# Patient Record
Sex: MALE | Race: BLACK OR AFRICAN AMERICAN | Employment: FULL TIME | URBAN - METROPOLITAN AREA
[De-identification: names, ages, dates, MRNs, and addresses within clinical notes are randomized per-mention and may not be internally consistent; named-entity substitution may affect disease eponyms.]

---

## 2017-05-19 ENCOUNTER — TELEPHONE (OUTPATIENT)
Dept: FAMILY MEDICINE CLINIC | Facility: CLINIC | Age: 22
End: 2017-05-19

## 2017-05-19 DIAGNOSIS — Z11.1 SCREENING-PULMONARY TB: Primary | ICD-10-CM

## 2017-05-19 NOTE — TELEPHONE ENCOUNTER
Per Aarti Beth of [facility] she has an  appt on 06/12/2017 @ 11:20AM would like to know is FR can see pt also, no available appt except for MD approval only.

## 2017-06-14 ENCOUNTER — OFFICE VISIT (OUTPATIENT)
Dept: FAMILY MEDICINE CLINIC | Facility: CLINIC | Age: 22
End: 2017-06-14

## 2017-06-14 VITALS
RESPIRATION RATE: 16 BRPM | TEMPERATURE: 98 F | HEIGHT: 73 IN | HEART RATE: 86 BPM | SYSTOLIC BLOOD PRESSURE: 100 MMHG | WEIGHT: 197 LBS | BODY MASS INDEX: 26.11 KG/M2 | DIASTOLIC BLOOD PRESSURE: 70 MMHG

## 2017-06-14 DIAGNOSIS — R21 RASH AND NONSPECIFIC SKIN ERUPTION: ICD-10-CM

## 2017-06-14 DIAGNOSIS — Z00.00 PREVENTATIVE HEALTH CARE: Primary | ICD-10-CM

## 2017-06-14 DIAGNOSIS — H10.11 ALLERGIC CONJUNCTIVITIS, RIGHT: ICD-10-CM

## 2017-06-14 PROCEDURE — 99212 OFFICE O/P EST SF 10 MIN: CPT | Performed by: FAMILY MEDICINE

## 2017-06-14 PROCEDURE — 99395 PREV VISIT EST AGE 18-39: CPT | Performed by: FAMILY MEDICINE

## 2017-06-14 PROCEDURE — 99213 OFFICE O/P EST LOW 20 MIN: CPT | Performed by: FAMILY MEDICINE

## 2017-06-14 RX ORDER — FEXOFENADINE HCL 180 MG/1
180 TABLET ORAL DAILY
Qty: 30 TABLET | Refills: 1 | Status: SHIPPED | OUTPATIENT
Start: 2017-06-14 | End: 2021-12-28 | Stop reason: ALTCHOICE

## 2017-06-14 RX ORDER — KETOTIFEN FUMARATE 0.35 MG/ML
1 SOLUTION/ DROPS OPHTHALMIC 2 TIMES DAILY
Qty: 5 ML | Refills: 0 | Status: SHIPPED | OUTPATIENT
Start: 2017-06-14 | End: 2020-07-21 | Stop reason: ALTCHOICE

## 2017-06-14 NOTE — PROGRESS NOTES
HPI:    Patient ID: Tashia Ha is a 22year old male. HPI Comments: 22year old AA male for routine preventive care. No interest in STD screening. Thriving @ [facility] (entering [facility] year). Bowel, bladder, and sexual function intact. Mouth/Throat: Oropharynx is clear and moist.   Neck: No thyromegaly present. Cardiovascular: Normal rate, regular rhythm and normal heart sounds. No murmur heard. Pulmonary/Chest: Effort normal and breath sounds normal. No respiratory distress.    Abd

## 2018-03-02 ENCOUNTER — TELEPHONE (OUTPATIENT)
Dept: FAMILY MEDICINE CLINIC | Facility: CLINIC | Age: 23
End: 2018-03-02

## 2018-03-03 NOTE — TELEPHONE ENCOUNTER
Pt was not available, but his grandmother stts that pt is leaving for college on Monday, and will get the shots that he needs for traveling there. Advised that pt had Hep A #1 iin 2015, and might need one more to complete the series.

## 2018-12-11 ENCOUNTER — OFFICE VISIT (OUTPATIENT)
Dept: FAMILY MEDICINE CLINIC | Facility: CLINIC | Age: 23
End: 2018-12-11
Payer: COMMERCIAL

## 2018-12-11 VITALS
SYSTOLIC BLOOD PRESSURE: 100 MMHG | DIASTOLIC BLOOD PRESSURE: 84 MMHG | HEIGHT: 73 IN | TEMPERATURE: 96 F | BODY MASS INDEX: 28.63 KG/M2 | WEIGHT: 216 LBS | HEART RATE: 89 BPM | RESPIRATION RATE: 17 BRPM

## 2018-12-11 DIAGNOSIS — Z13.29 THYROID DISORDER SCREEN: ICD-10-CM

## 2018-12-11 DIAGNOSIS — Z71.84 COUNSELING FOR TRAVEL: Primary | ICD-10-CM

## 2018-12-11 DIAGNOSIS — Z13.220 LIPID SCREENING: ICD-10-CM

## 2018-12-11 DIAGNOSIS — Z00.00 ROUTINE PHYSICAL EXAMINATION: ICD-10-CM

## 2018-12-11 DIAGNOSIS — Z23 NEED FOR HEPATITIS A IMMUNIZATION: ICD-10-CM

## 2018-12-11 DIAGNOSIS — Z11.3 SCREEN FOR STD (SEXUALLY TRANSMITTED DISEASE): ICD-10-CM

## 2018-12-11 PROCEDURE — 99214 OFFICE O/P EST MOD 30 MIN: CPT | Performed by: FAMILY MEDICINE

## 2018-12-11 PROCEDURE — 90632 HEPA VACCINE ADULT IM: CPT | Performed by: FAMILY MEDICINE

## 2018-12-11 PROCEDURE — 99212 OFFICE O/P EST SF 10 MIN: CPT | Performed by: FAMILY MEDICINE

## 2018-12-11 PROCEDURE — 99395 PREV VISIT EST AGE 18-39: CPT | Performed by: FAMILY MEDICINE

## 2018-12-11 PROCEDURE — 90471 IMMUNIZATION ADMIN: CPT | Performed by: FAMILY MEDICINE

## 2018-12-11 RX ORDER — MEFLOQUINE HYDROCHLORIDE 250 MG/1
250 TABLET ORAL
Qty: 6 TABLET | Refills: 0 | Status: SHIPPED | OUTPATIENT
Start: 2018-12-11 | End: 2020-07-21 | Stop reason: ALTCHOICE

## 2018-12-11 NOTE — PATIENT INSTRUCTIONS
All adult screening ordered and done appropriate for patient's age and gender and risk factors and complaints. Travel vaccines ordered and prophylaxis prescriptions given.   Patient counseled on the importance of abstinence and if sex occurs of any type co

## 2018-12-11 NOTE — PROGRESS NOTES
HPI:    Patient ID: Miki Dooley is a 25year old male.     25year old AA male here for complete preventive care physical and for status update on any confirmed chronic medical illnesses and follow up on any previous labs or procedures that were sugge Neurological: He is alert and oriented to person, place, and time. ASSESSMENT/PLAN:   1. Counseling for travel  Prescribed  - Mefloquine HCl 250 MG Oral Tab; Take 1 tablet (250 mg total) by mouth every 7 days.  Take the first tablet exactly o screening ordered and done appropriate for patient's age and gender and risk factors and complaints. Travel vaccines ordered and prophylaxis prescriptions given.   Patient counseled on the importance of abstinence and if sex occurs of any type condoms milviau

## 2018-12-18 ENCOUNTER — TELEPHONE (OUTPATIENT)
Dept: FAMILY MEDICINE CLINIC | Facility: CLINIC | Age: 23
End: 2018-12-18

## 2019-07-16 ENCOUNTER — TELEPHONE (OUTPATIENT)
Dept: FAMILY MEDICINE CLINIC | Facility: CLINIC | Age: 24
End: 2019-07-16

## 2019-07-16 NOTE — TELEPHONE ENCOUNTER
Pt would like to know if he needs another meningitis vaccination also would like to  copy of his vaccines. Pt states message can be left on his cell phone. Per pt he will be leaving next week to [place].

## 2019-07-23 ENCOUNTER — TELEPHONE (OUTPATIENT)
Dept: FAMILY MEDICINE CLINIC | Facility: CLINIC | Age: 24
End: 2019-07-23

## 2019-07-23 NOTE — TELEPHONE ENCOUNTER
Pt called stated he faxed today a [place] [employer] form to be completed for MMR form to be completed    Pt Called to see if form was received  Called office to verify if the form was received  Was told form was not here.     Pt said he melyssa

## 2020-07-21 ENCOUNTER — OFFICE VISIT (OUTPATIENT)
Dept: FAMILY MEDICINE CLINIC | Facility: CLINIC | Age: 25
End: 2020-07-21
Payer: COMMERCIAL

## 2020-07-21 VITALS
SYSTOLIC BLOOD PRESSURE: 128 MMHG | HEIGHT: 73 IN | RESPIRATION RATE: 18 BRPM | DIASTOLIC BLOOD PRESSURE: 77 MMHG | WEIGHT: 223 LBS | HEART RATE: 72 BPM | TEMPERATURE: 97 F | BODY MASS INDEX: 29.55 KG/M2

## 2020-07-21 DIAGNOSIS — Z11.3 ROUTINE SCREENING FOR STI (SEXUALLY TRANSMITTED INFECTION): ICD-10-CM

## 2020-07-21 DIAGNOSIS — Z00.00 ENCOUNTER FOR PREVENTIVE CARE: Primary | ICD-10-CM

## 2020-07-21 PROCEDURE — 3078F DIAST BP <80 MM HG: CPT | Performed by: FAMILY MEDICINE

## 2020-07-21 PROCEDURE — 3008F BODY MASS INDEX DOCD: CPT | Performed by: FAMILY MEDICINE

## 2020-07-21 PROCEDURE — 36415 COLL VENOUS BLD VENIPUNCTURE: CPT | Performed by: FAMILY MEDICINE

## 2020-07-21 PROCEDURE — 3074F SYST BP LT 130 MM HG: CPT | Performed by: FAMILY MEDICINE

## 2020-07-21 PROCEDURE — 99395 PREV VISIT EST AGE 18-39: CPT | Performed by: FAMILY MEDICINE

## 2020-07-21 NOTE — PROGRESS NOTES
HPI:    Patient ID: Miki Dooley is a 22year old male. This patient is a 81-year-old -American male who presents today for general preventive care physical as well as STI screening. He is asymptomatic and has no known contacts.   Good appet REFLEX TO FREE T4 [45711][Q]      LIPID PANEL [4060] [Q]      COMP METABOLIC PANEL [81691] [Q]      CBC [6399] [Q]      HEPATITIS PANEL, ACUTE [08685] [Q]      T Pallidum Screening Cascade [653][Q]      HIV-1/HIV-2 ANTIBODIES [95393] [Q]      Chlamydia/Gc

## 2020-07-22 LAB
ABSOLUTE BASOPHILS: 20 CELLS/UL (ref 0–200)
ABSOLUTE EOSINOPHILS: 102 CELLS/UL (ref 15–500)
ABSOLUTE LYMPHOCYTES: 1360 CELLS/UL (ref 850–3900)
ABSOLUTE MONOCYTES: 354 CELLS/UL (ref 200–950)
ABSOLUTE NEUTROPHILS: 1064 CELLS/UL (ref 1500–7800)
ALBUMIN/GLOBULIN RATIO: 1.5 (CALC) (ref 1–2.5)
ALBUMIN: 4.3 G/DL (ref 3.6–5.1)
ALKALINE PHOSPHATASE: 83 U/L (ref 36–130)
ALT: 25 U/L (ref 9–46)
AST: 19 U/L (ref 10–40)
BASOPHILS: 0.7 %
BILIRUBIN, TOTAL: 0.9 MG/DL (ref 0.2–1.2)
BUN: 10 MG/DL (ref 7–25)
CALCIUM: 9.8 MG/DL (ref 8.6–10.3)
CARBON DIOXIDE: 28 MMOL/L (ref 20–32)
CHLAMYDIA TRACHOMATIS$RNA, TMA: NOT DETECTED
CHLORIDE: 104 MMOL/L (ref 98–110)
CHOL/HDLC RATIO: 2.1 (CALC)
CHOLESTEROL, TOTAL: 124 MG/DL
CREATININE: 0.87 MG/DL (ref 0.6–1.35)
EGFR IF AFRICN AM: 141 ML/MIN/1.73M2
EGFR IF NONAFRICN AM: 122 ML/MIN/1.73M2
EOSINOPHILS: 3.5 %
GLOBULIN: 2.9 G/DL (CALC) (ref 1.9–3.7)
GLUCOSE: 87 MG/DL (ref 65–99)
HDL CHOLESTEROL: 58 MG/DL
HEMATOCRIT: 49 % (ref 38.5–50)
HEMOGLOBIN: 16.8 G/DL (ref 13.2–17.1)
LDL-CHOLESTEROL: 53 MG/DL (CALC)
LYMPHOCYTES: 46.9 %
MCH: 30.5 PG (ref 27–33)
MCHC: 34.3 G/DL (ref 32–36)
MCV: 89.1 FL (ref 80–100)
MONOCYTES: 12.2 %
MPV: 11.9 FL (ref 7.5–12.5)
NEISSERIA GONORRHOEAE$RNA, TMA: NOT DETECTED
NEUTROPHILS: 36.7 %
NON-HDL CHOLESTEROL: 66 MG/DL (CALC)
PLATELET COUNT: 168 THOUSAND/UL (ref 140–400)
POTASSIUM: 4.7 MMOL/L (ref 3.5–5.3)
PROTEIN, TOTAL: 7.2 G/DL (ref 6.1–8.1)
RDW: 13.2 % (ref 11–15)
RED BLOOD CELL COUNT: 5.5 MILLION/UL (ref 4.2–5.8)
SIGNAL TO CUT-OFF: 0.02
SODIUM: 140 MMOL/L (ref 135–146)
TRIGLYCERIDES: 53 MG/DL
TSH W/REFLEX TO FT4: 0.74 MIU/L (ref 0.4–4.5)
WHITE BLOOD CELL COUNT: 2.9 THOUSAND/UL (ref 3.8–10.8)

## 2020-07-23 ENCOUNTER — TELEPHONE (OUTPATIENT)
Dept: FAMILY MEDICINE CLINIC | Facility: CLINIC | Age: 25
End: 2020-07-23

## 2020-07-23 DIAGNOSIS — D72.819 LEUKOPENIA, UNSPECIFIED TYPE: Primary | ICD-10-CM

## 2020-07-23 NOTE — TELEPHONE ENCOUNTER
Patient states he was advised to let Dr know if he was going to be in town for a while so the hematologist can look into his labs.  Patient states he will be in town for the next 3 weeks

## 2020-07-23 NOTE — TELEPHONE ENCOUNTER
Patient states will be in town for the next week and 1/2, requesting callback asap to get an understanding from Dr about message left about hematologist.

## 2020-07-24 NOTE — TELEPHONE ENCOUNTER
I left an extensive message with this patient. This is not an acute situation. I believe that he is healthy. Some -Americans actually have a low white blood cell count and left that body is fighting some off.   We can follow him with a simple CBC

## 2020-12-24 ENCOUNTER — TELEPHONE (OUTPATIENT)
Dept: FAMILY MEDICINE CLINIC | Facility: CLINIC | Age: 25
End: 2020-12-24

## 2020-12-24 DIAGNOSIS — D72.819 LEUKOPENIA, UNSPECIFIED TYPE: Primary | ICD-10-CM

## 2020-12-24 NOTE — TELEPHONE ENCOUNTER
Patient states per last visit he was infromed his white blood cells were low. Wants to know if there is anything he should be following up on. should he be getting any blood work? Patient states he has been taking Vitamin D and he feels well.   Ok to get ba

## 2020-12-24 NOTE — TELEPHONE ENCOUNTER
This is not uncommon in -American race. We can do a repeat CBC just for assessing any progress or establishing stability. Nothing to worry. When he can he can come in for a repeat CBC. The order is on the chart.

## 2020-12-29 ENCOUNTER — PATIENT MESSAGE (OUTPATIENT)
Dept: FAMILY MEDICINE CLINIC | Facility: CLINIC | Age: 25
End: 2020-12-29

## 2020-12-29 NOTE — TELEPHONE ENCOUNTER
From: [facility]  To:  Ella Black DO  Sent: 12/29/2020 7:29 AM CST  Subject: Test Results Thomas Art Single, I have a question about CBC WITH DIFFERENTIAL WITH PLATELET resulted on 12/29/20 at 12:14 AM. So what does my results me

## 2020-12-30 DIAGNOSIS — D75.1 POLYCYTHEMIA: Primary | ICD-10-CM

## 2020-12-30 NOTE — TELEPHONE ENCOUNTER
Vitamin D 3 and C are not the cause of the elevated red blood cell count. Please help the patient with the \"David\" portion of his question.

## 2020-12-31 ENCOUNTER — VIRTUAL PHONE E/M (OUTPATIENT)
Dept: FAMILY MEDICINE CLINIC | Facility: CLINIC | Age: 25
End: 2020-12-31
Payer: COMMERCIAL

## 2020-12-31 DIAGNOSIS — D75.1 POLYCYTHEMIA: Primary | ICD-10-CM

## 2020-12-31 DIAGNOSIS — D70.9 NEUTROPENIA, UNSPECIFIED TYPE (HCC): ICD-10-CM

## 2020-12-31 PROCEDURE — 99442 PHONE E/M BY PHYS 11-20 MIN: CPT | Performed by: FAMILY MEDICINE

## 2020-12-31 NOTE — PATIENT INSTRUCTIONS
The patient has agreed to see the hematologist.  I reviewed with the patient regarding his increased hemoglobin on intermittent occasions and told him statistically this is probably just who he is.   I did explain that it is necessary to letter hematologist

## 2020-12-31 NOTE — PROGRESS NOTES
Virtual Telephone Check-In    Garrett Dancer verbally consents to a Virtual/Telephone Check-In visit on 12/31/20.     Patient understands and accepts financial responsibility for any deductible, co-insurance and/or co-pays associated with this servic real-time interactive audio and/or video communication.   This has been done in good tawana to provide continuity of care in the best interest of the provider-patient relationship, due to the ongoing public health crisis/national emergency and because of res

## 2021-01-05 ENCOUNTER — OFFICE VISIT (OUTPATIENT)
Dept: HEMATOLOGY/ONCOLOGY | Facility: HOSPITAL | Age: 26
End: 2021-01-05
Attending: INTERNAL MEDICINE
Payer: COMMERCIAL

## 2021-01-05 VITALS
HEART RATE: 101 BPM | WEIGHT: 236 LBS | DIASTOLIC BLOOD PRESSURE: 82 MMHG | BODY MASS INDEX: 31.28 KG/M2 | SYSTOLIC BLOOD PRESSURE: 154 MMHG | TEMPERATURE: 98 F | OXYGEN SATURATION: 100 % | RESPIRATION RATE: 16 BRPM | HEIGHT: 73 IN

## 2021-01-05 DIAGNOSIS — D75.1 ERYTHROCYTOSIS: ICD-10-CM

## 2021-01-05 DIAGNOSIS — Z83.2 FAMILY HISTORY OF SICKLE CELL DISEASE: ICD-10-CM

## 2021-01-05 DIAGNOSIS — D75.1 ERYTHROCYTOSIS: Primary | ICD-10-CM

## 2021-01-05 LAB
ALBUMIN SERPL-MCNC: 4 G/DL (ref 3.4–5)
ALBUMIN/GLOB SERPL: 1 {RATIO} (ref 1–2)
ALP LIVER SERPL-CCNC: 94 U/L
ALT SERPL-CCNC: 54 U/L
ANION GAP SERPL CALC-SCNC: 5 MMOL/L (ref 0–18)
AST SERPL-CCNC: 28 U/L (ref 15–37)
BACTERIA UR QL AUTO: NEGATIVE /HPF
BASOPHILS # BLD AUTO: 0.01 X10(3) UL (ref 0–0.2)
BASOPHILS NFR BLD AUTO: 0.2 %
BILIRUB SERPL-MCNC: 0.4 MG/DL (ref 0.1–2)
BILIRUB UR QL: NEGATIVE
BUN BLD-MCNC: 14 MG/DL (ref 7–18)
BUN/CREAT SERPL: 13.7 (ref 10–20)
CALCIUM BLD-MCNC: 10.1 MG/DL (ref 8.5–10.1)
CHLORIDE SERPL-SCNC: 105 MMOL/L (ref 98–112)
CLARITY UR: CLEAR
CO2 SERPL-SCNC: 29 MMOL/L (ref 21–32)
COLOR UR: YELLOW
CREAT BLD-MCNC: 1.02 MG/DL
DEPRECATED HBV CORE AB SER IA-ACNC: 84.2 NG/ML
DEPRECATED RDW RBC AUTO: 40.8 FL (ref 35.1–46.3)
EOSINOPHIL # BLD AUTO: 0.09 X10(3) UL (ref 0–0.7)
EOSINOPHIL NFR BLD AUTO: 2.1 %
ERYTHROCYTE [DISTWIDTH] IN BLOOD BY AUTOMATED COUNT: 12.5 % (ref 11–15)
GLOBULIN PLAS-MCNC: 4.2 G/DL (ref 2.8–4.4)
GLUCOSE BLD-MCNC: 72 MG/DL (ref 70–99)
GLUCOSE UR-MCNC: NEGATIVE MG/DL
HCT VFR BLD AUTO: 52.8 %
HGB BLD-MCNC: 18 G/DL
HGB UR QL STRIP.AUTO: NEGATIVE
IMM GRANULOCYTES # BLD AUTO: 0.01 X10(3) UL (ref 0–1)
IMM GRANULOCYTES NFR BLD: 0.2 %
IRON SATURATION: 26 %
IRON SERPL-MCNC: 97 UG/DL
KETONES UR-MCNC: NEGATIVE MG/DL
LEUKOCYTE ESTERASE UR QL STRIP.AUTO: NEGATIVE
LYMPHOCYTES # BLD AUTO: 1.32 X10(3) UL (ref 1–4)
LYMPHOCYTES NFR BLD AUTO: 30.8 %
M PROTEIN MFR SERPL ELPH: 8.2 G/DL (ref 6.4–8.2)
MCH RBC QN AUTO: 30.1 PG (ref 26–34)
MCHC RBC AUTO-ENTMCNC: 34.1 G/DL (ref 31–37)
MCV RBC AUTO: 88.3 FL
MONOCYTES # BLD AUTO: 0.39 X10(3) UL (ref 0.1–1)
MONOCYTES NFR BLD AUTO: 9.1 %
NEUTROPHILS # BLD AUTO: 2.47 X10 (3) UL (ref 1.5–7.7)
NEUTROPHILS # BLD AUTO: 2.47 X10(3) UL (ref 1.5–7.7)
NEUTROPHILS NFR BLD AUTO: 57.6 %
NITRITE UR QL STRIP.AUTO: NEGATIVE
OSMOLALITY SERPL CALC.SUM OF ELEC: 287 MOSM/KG (ref 275–295)
PATIENT FASTING Y/N/NP: NO
PH UR: 6 [PH] (ref 5–8)
PLATELET # BLD AUTO: 184 10(3)UL (ref 150–450)
POTASSIUM SERPL-SCNC: 4 MMOL/L (ref 3.5–5.1)
PROT UR-MCNC: NEGATIVE MG/DL
RBC # BLD AUTO: 5.98 X10(6)UL
RBC #/AREA URNS AUTO: 1 /HPF
SODIUM SERPL-SCNC: 139 MMOL/L (ref 136–145)
SP GR UR STRIP: 1.02 (ref 1–1.03)
TOTAL IRON BINDING CAPACITY: 378 UG/DL (ref 240–450)
TRANSFERRIN SERPL-MCNC: 254 MG/DL (ref 200–360)
UROBILINOGEN UR STRIP-ACNC: <2
WBC # BLD AUTO: 4.3 X10(3) UL (ref 4–11)
WBC #/AREA URNS AUTO: 0 /HPF

## 2021-01-05 PROCEDURE — 86038 ANTINUCLEAR ANTIBODIES: CPT

## 2021-01-05 PROCEDURE — 82668 ASSAY OF ERYTHROPOIETIN: CPT

## 2021-01-05 PROCEDURE — 81270 JAK2 GENE: CPT

## 2021-01-05 PROCEDURE — 83021 HEMOGLOBIN CHROMOTOGRAPHY: CPT

## 2021-01-05 PROCEDURE — 85060 BLOOD SMEAR INTERPRETATION: CPT

## 2021-01-05 PROCEDURE — 86225 DNA ANTIBODY NATIVE: CPT

## 2021-01-05 PROCEDURE — 81338 MPL GENE COMMON VARIANTS: CPT

## 2021-01-05 PROCEDURE — 83540 ASSAY OF IRON: CPT

## 2021-01-05 PROCEDURE — 86235 NUCLEAR ANTIGEN ANTIBODY: CPT

## 2021-01-05 PROCEDURE — 81219 CALR GENE COM VARIANTS: CPT

## 2021-01-05 PROCEDURE — 81001 URINALYSIS AUTO W/SCOPE: CPT

## 2021-01-05 PROCEDURE — 86039 ANTINUCLEAR ANTIBODIES (ANA): CPT

## 2021-01-05 PROCEDURE — 36415 COLL VENOUS BLD VENIPUNCTURE: CPT

## 2021-01-05 PROCEDURE — 83020 HEMOGLOBIN ELECTROPHORESIS: CPT

## 2021-01-05 PROCEDURE — 84466 ASSAY OF TRANSFERRIN: CPT

## 2021-01-05 PROCEDURE — 99244 OFF/OP CNSLTJ NEW/EST MOD 40: CPT | Performed by: INTERNAL MEDICINE

## 2021-01-05 PROCEDURE — 82728 ASSAY OF FERRITIN: CPT

## 2021-01-05 PROCEDURE — 85025 COMPLETE CBC W/AUTO DIFF WBC: CPT

## 2021-01-05 PROCEDURE — 81403 MOPATH PROCEDURE LEVEL 4: CPT

## 2021-01-05 PROCEDURE — 80053 COMPREHEN METABOLIC PANEL: CPT

## 2021-01-05 NOTE — PROGRESS NOTES
Hematology Consultation Note    Patient Name: Irvin Bocanegra   YOB: 1995   Medical Record Number: [id number]   CSN: [id number]   Consulting Physician: Anu Duenas MD  Referring Physician(s): Rosie Amador  Date of Consultation: 1/ Family Medical History:  Family History   Problem Relation Age of Onset   • Hypertension Paternal Grandmother    • Diabetes Maternal Aunt    • Cancer Other         mother's cousin; uncertain type   • Sickle Cell Paternal Aunt    • Bleeding Disorders marked as taking for the 1/5/21 encounter (Office Visit) with Nettie Chopra MD.      Review of Systems:    Constitutional: Negative for anorexia, chills, fatigue, fevers, night sweats and weight loss.   Eyes: Negative for visual disturbance, irritation an WBC 4.3 01/05/2021 01:17 PM    RBC 5.98 (H) 01/05/2021 01:17 PM    HGB 18.0 (H) 01/05/2021 01:17 PM    HCT 52.8 01/05/2021 01:17 PM    MCV 88.3 01/05/2021 01:17 PM    MCH 30.1 01/05/2021 01:17 PM    MCHC 34.1 01/05/2021 01:17 PM    RDW 12.5 01/05/2021 01:1 possible polycythemia vera      --Discussed with patient that based on his family history of sickle cell disease, we should screen him for sickle cell trait as patients with this condition may have renal medullary carcinoma.   Discussed that some kidney vince

## 2021-01-06 LAB
ERYTHROPOIETIN (EPO): 8 MU/ML
HGB A2 MFR BLD: 2.6 % (ref 1.5–3.5)
HGB PNL BLD ELPH: 97.4 % (ref 95.5–100)

## 2021-01-07 LAB — NUCLEAR IGG TITR SER IF: POSITIVE {TITER}

## 2021-01-08 LAB
ANA NUCLEOLAR TITR SER IF: 80 {TITER}
DSDNA AB TITR SER: <10 {TITER}

## 2021-01-10 LAB — JAK2 EXON 12 MUTATION ANAL PCR: NOT DETECTED

## 2021-01-11 LAB — JAK2 GENE, V617F MUTATION, QUALITATIVE: NOT DETECTED

## 2021-01-12 LAB
ENA SM IGG SER QL: NEGATIVE
ENA SM+RNP AB SER QL: NEGATIVE
ENA SS-A AB SER QL IA: NEGATIVE
ENA SS-B AB SER QL IA: NEGATIVE

## 2021-01-18 ENCOUNTER — TELEPHONE (OUTPATIENT)
Dept: HEMATOLOGY/ONCOLOGY | Facility: HOSPITAL | Age: 26
End: 2021-01-18

## 2021-01-18 NOTE — TELEPHONE ENCOUNTER
I telephoned the patient regarding his lab results. We reviewed lab results appear to be consistent with volume contraction possibly from dehydration as he is not on any diuretics that could cause this effect.   He does not appear to have a myeloproliferat

## 2021-01-20 ENCOUNTER — TELEPHONE (OUTPATIENT)
Dept: HEMATOLOGY/ONCOLOGY | Facility: HOSPITAL | Age: 26
End: 2021-01-20

## 2021-01-20 LAB — CALR EXON 9 MUTATION ANALYSIS - RESULT: NOT DETECTED

## 2021-01-20 NOTE — TELEPHONE ENCOUNTER
Called patient to inform him that remaining lab tests related to P vera have returned with negative/normal results. He does NOT have a CALR mutation. Reviewed that he will follow up as planned in 4/2021. He thanked me for the call.     Ella Mcmullen MD  El

## 2021-01-25 LAB — MPL CODON 515 RESULTS: NOT DETECTED

## 2021-03-31 DIAGNOSIS — D75.1 ERYTHROCYTOSIS: Primary | ICD-10-CM

## 2021-04-01 ENCOUNTER — NURSE ONLY (OUTPATIENT)
Dept: HEMATOLOGY/ONCOLOGY | Facility: HOSPITAL | Age: 26
End: 2021-04-01
Attending: INTERNAL MEDICINE
Payer: COMMERCIAL

## 2021-04-01 VITALS
TEMPERATURE: 98 F | WEIGHT: 233 LBS | RESPIRATION RATE: 16 BRPM | BODY MASS INDEX: 30.88 KG/M2 | SYSTOLIC BLOOD PRESSURE: 129 MMHG | DIASTOLIC BLOOD PRESSURE: 77 MMHG | OXYGEN SATURATION: 100 % | HEIGHT: 73 IN | HEART RATE: 72 BPM

## 2021-04-01 DIAGNOSIS — D75.1 ERYTHROCYTOSIS: Primary | ICD-10-CM

## 2021-04-01 DIAGNOSIS — D70.9 NEUTROPENIA, UNSPECIFIED TYPE (HCC): ICD-10-CM

## 2021-04-01 DIAGNOSIS — D75.1 ERYTHROCYTOSIS: ICD-10-CM

## 2021-04-01 PROCEDURE — 36415 COLL VENOUS BLD VENIPUNCTURE: CPT

## 2021-04-01 PROCEDURE — 85025 COMPLETE CBC W/AUTO DIFF WBC: CPT

## 2021-04-01 PROCEDURE — 99214 OFFICE O/P EST MOD 30 MIN: CPT | Performed by: INTERNAL MEDICINE

## 2021-04-01 NOTE — PROGRESS NOTES
Hematology Progress Note    Patient Name: Lottie Betancourt   YOB: 1995   Medical Record Number: [id number]   CSN: [id number]   Consulting Physician: Sierra Dos Santos MD  Referring Physician(s): Lorraine Medina  Date of Visit: 4/1/2021 malignancy hematologic processes which could be contributing. Today, his results continue to show no signs of elevated hemoglobin hematocrit. Slightly lowered WBC count noted. Teo Rivera denies any systemic signs of illness.   His review of systems is otherw     Feeling of Stress :   Social Connections:       Frequency of Communication with Friends and Family:       Frequency of Social Gatherings with Friends and Family:       Attends Jewish Services:       Active Member of Clubs or Organizations:       A Neck is supple. Lymphatics: There is no palpable lymphadenopathy throughout in the cervical, supraclavicular, axillary, or inguinal regions. Chest: Clear to auscultation. No wheezes or rales. Heart: Regular rate and rhythm.  S1S2 normal.  Abdomen: Soft, consistent with volume contraction possibly from dehydration as he is not on any diuretics that could cause this effect. He does not appear to have a myeloproliferative neoplasm such as polycythemia vera.   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

## 2021-04-09 DIAGNOSIS — Z23 NEED FOR VACCINATION: ICD-10-CM

## 2021-07-26 ENCOUNTER — HOSPITAL ENCOUNTER (OUTPATIENT)
Age: 26
Discharge: HOME | End: 2021-07-26
Payer: COMMERCIAL

## 2021-07-26 VITALS
TEMPERATURE: 98 F | DIASTOLIC BLOOD PRESSURE: 78 MMHG | OXYGEN SATURATION: 99 % | HEART RATE: 86 BPM | RESPIRATION RATE: 24 BRPM | SYSTOLIC BLOOD PRESSURE: 133 MMHG

## 2021-07-26 DIAGNOSIS — Z20.822 ENCOUNTER FOR LABORATORY TESTING FOR COVID-19 VIRUS: Primary | ICD-10-CM

## 2021-07-26 LAB — SARS-COV-2 RNA RESP QL NAA+PROBE: NOT DETECTED

## 2021-07-26 PROCEDURE — 99202 OFFICE O/P NEW SF 15 MIN: CPT

## 2021-07-26 PROCEDURE — 99212 OFFICE O/P EST SF 10 MIN: CPT

## 2021-07-26 NOTE — ED PROVIDER NOTES
Patient Seen in: [facility]      History   Patient presents with:  Testing    Stated Complaint: testing    HPI/Subjective:   HPI    This is a well-appearing 28-year-old male who presents for Covid testing.   We need to make any the negative Co Rhythm: Normal rate and regular rhythm. Pulses: Normal pulses. Heart sounds: Normal heart sounds. Pulmonary:      Effort: Pulmonary effort is normal.      Breath sounds: Normal breath sounds. Skin:     General: Skin is warm and dry.       Capi

## 2021-07-27 LAB — SARS-COV-2 RNA RESP QL NAA+PROBE: NOT DETECTED

## 2021-10-01 ENCOUNTER — APPOINTMENT (OUTPATIENT)
Dept: HEMATOLOGY/ONCOLOGY | Facility: HOSPITAL | Age: 26
End: 2021-10-01
Attending: INTERNAL MEDICINE
Payer: COMMERCIAL

## 2021-12-23 ENCOUNTER — TELEPHONE (OUTPATIENT)
Dept: HEMATOLOGY/ONCOLOGY | Facility: HOSPITAL | Age: 26
End: 2021-12-23

## 2021-12-23 NOTE — TELEPHONE ENCOUNTER
Spoke with patient and clarified that he has a lab appointment to get a CBC drawn for Dr. Melva Cardoso appointment on Tuesday at 9:30am. Patient thanked me for calling.

## 2021-12-28 ENCOUNTER — NURSE ONLY (OUTPATIENT)
Dept: HEMATOLOGY/ONCOLOGY | Facility: HOSPITAL | Age: 26
End: 2021-12-28
Attending: INTERNAL MEDICINE
Payer: COMMERCIAL

## 2021-12-28 VITALS
RESPIRATION RATE: 16 BRPM | DIASTOLIC BLOOD PRESSURE: 74 MMHG | TEMPERATURE: 98 F | BODY MASS INDEX: 33.27 KG/M2 | HEART RATE: 68 BPM | OXYGEN SATURATION: 99 % | HEIGHT: 73 IN | WEIGHT: 251 LBS | SYSTOLIC BLOOD PRESSURE: 128 MMHG

## 2021-12-28 DIAGNOSIS — D75.1 ERYTHROCYTOSIS: Primary | ICD-10-CM

## 2021-12-28 DIAGNOSIS — D70.9 NEUTROPENIA, UNSPECIFIED TYPE (HCC): ICD-10-CM

## 2021-12-28 DIAGNOSIS — D75.1 ERYTHROCYTOSIS: ICD-10-CM

## 2021-12-28 PROCEDURE — 36415 COLL VENOUS BLD VENIPUNCTURE: CPT

## 2021-12-28 PROCEDURE — 85025 COMPLETE CBC W/AUTO DIFF WBC: CPT

## 2021-12-28 PROCEDURE — 99213 OFFICE O/P EST LOW 20 MIN: CPT | Performed by: INTERNAL MEDICINE

## 2021-12-28 NOTE — PROGRESS NOTES
Hematology Progress Note    Patient Name: Ronna Ridley   YOB: 1995   Medical Record Number: [id number]   CSN: [id number]   Consulting Physician: Zahra Wagner MD  Referring Physician(s): Andrae Headley  Date of Visit: 12/28/2021 malignancy hematologic processes which could be contributing. Today, his results continue to show no signs of elevated hemoglobin hematocrit.  Connor Sher has also had slightly lowered WBC count noted in the past with corresponding neutropenia that alternates lymphadenopathy. Musculoskeletal: Negative for myalgias, arthralgias, muscle weakness. Neurological: Negative for headaches, dizziness, speech problems, gait problems and weakness. A comprehensive 14 point review of systems was completed.   Pertinent 01:17 PM    AST 28 01/05/2021 01:17 PM    ALT 54 01/05/2021 01:17 PM         Impression:    No diagnosis found.   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 -- We reviewed this could be benign ethnic neutropenia as he is -American of this may also be related to autoimmune phenomenon.   Neith of these conditions require treatment,

## 2023-06-27 ENCOUNTER — OFFICE VISIT (OUTPATIENT)
Dept: FAMILY MEDICINE CLINIC | Facility: CLINIC | Age: 28
End: 2023-06-27

## 2023-06-27 ENCOUNTER — LAB ENCOUNTER (OUTPATIENT)
Dept: LAB | Age: 28
End: 2023-06-27
Attending: FAMILY MEDICINE
Payer: COMMERCIAL

## 2023-06-27 VITALS
HEART RATE: 96 BPM | HEIGHT: 73.5 IN | DIASTOLIC BLOOD PRESSURE: 84 MMHG | BODY MASS INDEX: 35.02 KG/M2 | WEIGHT: 270 LBS | SYSTOLIC BLOOD PRESSURE: 130 MMHG | TEMPERATURE: 97 F | OXYGEN SATURATION: 96 %

## 2023-06-27 DIAGNOSIS — Z00.00 ENCOUNTER FOR ANNUAL PHYSICAL EXAM: Primary | ICD-10-CM

## 2023-06-27 DIAGNOSIS — L91.8 SKIN TAG: ICD-10-CM

## 2023-06-27 DIAGNOSIS — Z86.2 HX OF IRON DEFICIENCY ANEMIA: ICD-10-CM

## 2023-06-27 DIAGNOSIS — Z23 NEED FOR VACCINATION: ICD-10-CM

## 2023-06-27 PROCEDURE — 3075F SYST BP GE 130 - 139MM HG: CPT

## 2023-06-27 PROCEDURE — 3008F BODY MASS INDEX DOCD: CPT

## 2023-06-27 PROCEDURE — 99395 PREV VISIT EST AGE 18-39: CPT

## 2023-06-27 PROCEDURE — 3079F DIAST BP 80-89 MM HG: CPT

## 2023-06-27 RX ORDER — ALBUTEROL SULFATE 90 UG/1
AEROSOL, METERED RESPIRATORY (INHALATION)
COMMUNITY
Start: 2023-05-13

## 2023-06-28 DIAGNOSIS — Z83.49 FAMILY HISTORY OF HASHIMOTO THYROIDITIS: ICD-10-CM

## 2023-06-28 DIAGNOSIS — R94.6 ABNORMAL THYROID FUNCTION TEST: Primary | ICD-10-CM

## 2023-06-28 LAB
% SATURATION: 17 % (CALC) (ref 20–48)
ALBUMIN/GLOBULIN RATIO: 1.3 (CALC) (ref 1–2.5)
ALBUMIN: 4.3 G/DL (ref 3.6–5.1)
ALKALINE PHOSPHATASE: 88 U/L (ref 36–130)
ALT: 47 U/L (ref 9–46)
AST: 23 U/L (ref 10–40)
BILIRUBIN, TOTAL: 0.5 MG/DL (ref 0.2–1.2)
BUN: 13 MG/DL (ref 7–25)
CALCIUM: 9.8 MG/DL (ref 8.6–10.3)
CARBON DIOXIDE: 26 MMOL/L (ref 20–32)
CHLORIDE: 104 MMOL/L (ref 98–110)
CHOL/HDLC RATIO: 2.5 (CALC)
CHOLESTEROL, TOTAL: 135 MG/DL
CREATININE: 0.99 MG/DL (ref 0.6–1.24)
EGFR: 108 ML/MIN/1.73M2
GLOBULIN: 3.2 G/DL (CALC) (ref 1.9–3.7)
GLUCOSE: 77 MG/DL (ref 65–99)
HDL CHOLESTEROL: 53 MG/DL
HEMATOCRIT: 46.5 % (ref 38.5–50)
HEMOGLOBIN: 15.8 G/DL (ref 13.2–17.1)
IRON BINDING CAPACITY: 282 MCG/DL (CALC) (ref 250–425)
IRON, TOTAL: 49 MCG/DL (ref 50–195)
LDL-CHOLESTEROL: 72 MG/DL (CALC)
MCH: 29 PG (ref 27–33)
MCHC: 34 G/DL (ref 32–36)
MCV: 85.3 FL (ref 80–100)
MPV: 11.9 FL (ref 7.5–12.5)
NON-HDL CHOLESTEROL: 82 MG/DL (CALC)
PLATELET COUNT: 211 THOUSAND/UL (ref 140–400)
POTASSIUM: 4.3 MMOL/L (ref 3.5–5.3)
PROTEIN, TOTAL: 7.5 G/DL (ref 6.1–8.1)
RDW: 13.7 % (ref 11–15)
RED BLOOD CELL COUNT: 5.45 MILLION/UL (ref 4.2–5.8)
SODIUM: 140 MMOL/L (ref 135–146)
T4, FREE: 1.2 NG/DL (ref 0.8–1.8)
TRIGLYCERIDES: 35 MG/DL
TSH W/REFLEX TO FT4: 0.3 MIU/L (ref 0.4–4.5)
WHITE BLOOD CELL COUNT: 5.5 THOUSAND/UL (ref 3.8–10.8)

## (undated) NOTE — MR AVS SNAPSHOT
[facility]  [address], [address] [place]  [phone]               Thank you for choosing us for your health care visit with Kenna Fernández, .   We are glad to serve you and happy to provide you with this sum Fluticasone Propionate 0.05 % Crea   Apply up to twice daily for 1-2 weeks   Commonly known as:  CUTIVATE           Ketotifen Fumarate 0.025 % Soln   Place 1 drop into the right eye 2 (two) times daily.    Commonly known as:  ZADITOR                Where t Eat plenty of protein, keep the fat content low Sugars:  sodas and sports drinks, candies and desserts   Eat plenty of low-fat dairy products High fat meats and dairy   Choose whole grain products Foods high in sodium   Water is best for hydration Fast tresa